# Patient Record
Sex: FEMALE | Race: WHITE | NOT HISPANIC OR LATINO | Employment: PART TIME | URBAN - METROPOLITAN AREA
[De-identification: names, ages, dates, MRNs, and addresses within clinical notes are randomized per-mention and may not be internally consistent; named-entity substitution may affect disease eponyms.]

---

## 2022-10-06 ENCOUNTER — OFFICE VISIT (OUTPATIENT)
Dept: DERMATOLOGY | Facility: CLINIC | Age: 32
End: 2022-10-06
Payer: COMMERCIAL

## 2022-10-06 DIAGNOSIS — Z12.83 SKIN CANCER SCREENING: ICD-10-CM

## 2022-10-06 DIAGNOSIS — L72.11 PILAR CYST: ICD-10-CM

## 2022-10-06 DIAGNOSIS — D22.9 MULTIPLE MELANOCYTIC NEVI: Primary | ICD-10-CM

## 2022-10-06 PROCEDURE — 99203 OFFICE O/P NEW LOW 30 MIN: CPT | Performed by: DERMATOLOGY

## 2022-10-06 ASSESSMENT — PAIN SCALES - GENERAL: PAINLEVEL: NO PAIN (0)

## 2022-10-06 NOTE — LETTER
10/6/2022       RE: Ciera Winters  [address]  [facility] [address]     Dear Colleague,    Thank you for referring your patient, Ciera Winters, to the [facility] DERMATOLOGY [facility] [place] [facility]. Please see a copy of my visit note below.    [facility] Dermatology Note  Encounter Date: Oct 6, 2022  Office Visit     Dermatology Problem List:  1. Skin check  - Benign nevi noted on exam  - Pilar cyst on right parietal scalp. Reassurance provided, can refer to derm surg if patient prefers in future     ____________________________________________    Assessment & Plan:     # Benign appearing nevi scattered on body   - <50 nevi overall  - Will monitor any change in the nevus noted on right third toe. Photo taken today.     # Pilar cyst on right parietal scalp  - Asymptomatic   - Reassurance provided. Can refer to dermatologic surgery if elective surgery is preferred in the future     Procedures Performed:   None    Follow-up: 1 year(s) in-person, or earlier for new or changing lesions    Staff and Medical Student:     Daysi Bateman, MS4    I was present with the medical student who participated in the service and in the documentation.  I have verified the history and personally performed the physical exam and medical decision making.  I agree with the assessment and plan of care as documented in the note.      Miah Deleon MD  Dermatology Attending  ____________________________________________    CC: Skin Check (Ciera is here for a skin check and has a spot of concern on her scalp.)    HPI:  Ms. Ciera Winters is a(n) 32 year old female who presents today as a new patient for skin check. She has no personal or family history of skin cancer. She has a few spots of concern. She noticed a cyst on her right parietal scalp that has been slightly growing but is overall asymptomatic. She also noticed a spot on her left upper  back next to her tattoo but it was there before that. It's been stable in size and color for several years but wants to make sure it is benign. She also noticed a brown spot on her right third toe that has been stable in size and color for years.     Patient is otherwise feeling well, without additional skin concerns.    Labs Reviewed:  None    Physical Exam:  Vitals: There were no vitals taken for this visit.  SKIN: Total skin excluding the undergarment areas was performed. The exam included the head/face, neck, both arms, chest, back, abdomen, both legs, digits and/or nails.   - 1 cm x .8 cm smooth, mobile, firm, round nodule on the right parietal scalp   - 4 x 4 mm skin colored papule with a patch of brown pigmentation and follicular prominence on the left upper back next to tattoo  - 5 x 5 mm symmetric brown papule with darker pigmentation in the furrows  - No other lesions of concern on areas examined.     Medications:  No current outpatient medications on file.     No current facility-administered medications for this visit.      Past Medical History:   There is no problem list on file for this patient.    No past medical history on file.          Again, thank you for allowing me to participate in the care of your patient.      Sincerely,    Miah Deleon MD

## 2022-10-06 NOTE — LETTER
Date:November 4, 2022      Patient was self referred, no letter generated. Do not send.        [facility] Health Information

## 2022-10-06 NOTE — NURSING NOTE
Dermatology Rooming Note    Ciera Winters's goals for this visit include:   Chief Complaint   Patient presents with     Skin Check     Ciera is here for a skin check and has a spot of concern on her scalp.     Basil Pena, EMT

## 2022-10-06 NOTE — PATIENT INSTRUCTIONS
The spots that you pointed out to us are all benign in nature. But, please keep an eye out for any changing spots that are growing, changing color, bleeding, itching, or causing pain.     For the cyst on your scalp, you can get it removed but it is elective and we can refer you to surgery.     We took a picture of the mole on your right toe and will monitor it for growth.     Please follow up in 1 year to check on the spot on your toe.

## 2022-10-06 NOTE — PROGRESS NOTES
[facility] Dermatology Note  Encounter Date: Oct 6, 2022  Office Visit     Dermatology Problem List:  1. Skin check  - Benign nevi noted on exam  - Pilar cyst on right parietal scalp. Reassurance provided, can refer to derm surg if patient prefers in future     ____________________________________________    Assessment & Plan:     # Benign appearing nevi scattered on body   - <50 nevi overall  - Will monitor any change in the nevus noted on right third toe. Photo taken today.     # Pilar cyst on right parietal scalp  - Asymptomatic   - Reassurance provided. Can refer to dermatologic surgery if elective surgery is preferred in the future     Procedures Performed:   None    Follow-up: 1 year(s) in-person, or earlier for new or changing lesions    Staff and Medical Student:     Daysi Bateman, MS4    I was present with the medical student who participated in the service and in the documentation.  I have verified the history and personally performed the physical exam and medical decision making.  I agree with the assessment and plan of care as documented in the note.      Miah Deleon MD  Dermatology Attending  ____________________________________________    CC: Skin Check (Ciera is here for a skin check and has a spot of concern on her scalp.)    HPI:  Ms. Ciera Winters is a(n) 32 year old female who presents today as a new patient for skin check. She has no personal or family history of skin cancer. She has a few spots of concern. She noticed a cyst on her right parietal scalp that has been slightly growing but is overall asymptomatic. She also noticed a spot on her left upper back next to her tattoo but it was there before that. It's been stable in size and color for several years but wants to make sure it is benign. She also noticed a brown spot on her right third toe that has been stable in size and color for years.     Patient is otherwise feeling well, without additional skin  concerns.    Labs Reviewed:  None    Physical Exam:  Vitals: There were no vitals taken for this visit.  SKIN: Total skin excluding the undergarment areas was performed. The exam included the head/face, neck, both arms, chest, back, abdomen, both legs, digits and/or nails.   - 1 cm x .8 cm smooth, mobile, firm, round nodule on the right parietal scalp   - 4 x 4 mm skin colored papule with a patch of brown pigmentation and follicular prominence on the left upper back next to tattoo  - 5 x 5 mm symmetric brown papule with darker pigmentation in the furrows  - No other lesions of concern on areas examined.     Medications:  No current outpatient medications on file.     No current facility-administered medications for this visit.      Past Medical History:   There is no problem list on file for this patient.    No past medical history on file.

## 2022-11-03 PROBLEM — Z12.83 SKIN CANCER SCREENING: Status: ACTIVE | Noted: 2022-11-03

## 2022-11-03 PROBLEM — L72.11 PILAR CYST: Status: ACTIVE | Noted: 2022-11-03

## 2022-11-03 PROBLEM — D22.9 MULTIPLE MELANOCYTIC NEVI: Status: ACTIVE | Noted: 2022-11-03

## 2023-03-25 ENCOUNTER — PATIENT OUTREACH (OUTPATIENT)
Dept: DERMATOLOGY | Facility: CLINIC | Age: 33
End: 2023-03-25
Payer: COMMERCIAL

## 2023-03-25 NOTE — TELEPHONE ENCOUNTER
Attempted to reach patient to schedule follow up in the Dermatology Clinic.  No answer,  LM on VM to call office and Letter mailed..    Schedule with Dr. Miah Deleon 10/2023.

## 2023-03-25 NOTE — LETTER
March 25, 2023      Ciera Winters  [address]  [place] [address]        Dear Ciera Winters:    We recently reviewed your medical records from [facility] and found that you are due for an appointment with Dr. Miah Deleon in October.  Our office has attempted to reach you via telephone and left a message.    At your earliest convenience, please call the clinic at [phone] to arrange the recommended exam and possible testing.  Please kindly mention this letter when calling so that your appointment(s) can be accurately scheduled.      Sincerely,       The Clinic Staff        Medical Record Number:  [id number]

## 2023-04-01 ENCOUNTER — PATIENT OUTREACH (OUTPATIENT)
Dept: DERMATOLOGY | Facility: CLINIC | Age: 33
End: 2023-04-01
Payer: COMMERCIAL

## 2023-04-01 NOTE — LETTER
April 1, 2023          Ciera Winters  [address]  [facility] [place] [facility] [address]        Dear Ciera Winters:    We recently reviewed your medical records from [facility] and found that you are due for an appointment with Dr. Miah Deleon in October.  Our office has attempted to reach you via telephone and left a message***.    At your earliest convenience, please call the clinic at [phone] to arrange the recommended exam and possible testing.  Please kindly mention this letter when calling so that your appointment(s) can be accurately scheduled.      Sincerely,       The Clinic Staff        Medical Record Number:  [id number]

## 2024-05-17 SDOH — HEALTH STABILITY: PHYSICAL HEALTH: ON AVERAGE, HOW MANY DAYS PER WEEK DO YOU ENGAGE IN MODERATE TO STRENUOUS EXERCISE (LIKE A BRISK WALK)?: 5 DAYS

## 2024-05-17 SDOH — HEALTH STABILITY: PHYSICAL HEALTH: ON AVERAGE, HOW MANY MINUTES DO YOU ENGAGE IN EXERCISE AT THIS LEVEL?: 30 MIN

## 2024-05-17 ASSESSMENT — SOCIAL DETERMINANTS OF HEALTH (SDOH): HOW OFTEN DO YOU GET TOGETHER WITH FRIENDS OR RELATIVES?: THREE TIMES A WEEK

## 2024-06-02 SDOH — HEALTH STABILITY: PHYSICAL HEALTH: ON AVERAGE, HOW MANY DAYS PER WEEK DO YOU ENGAGE IN MODERATE TO STRENUOUS EXERCISE (LIKE A BRISK WALK)?: 5 DAYS

## 2024-06-02 SDOH — HEALTH STABILITY: PHYSICAL HEALTH: ON AVERAGE, HOW MANY MINUTES DO YOU ENGAGE IN EXERCISE AT THIS LEVEL?: 30 MIN

## 2024-06-02 ASSESSMENT — SOCIAL DETERMINANTS OF HEALTH (SDOH): HOW OFTEN DO YOU GET TOGETHER WITH FRIENDS OR RELATIVES?: THREE TIMES A WEEK

## 2024-06-03 ENCOUNTER — OFFICE VISIT (OUTPATIENT)
Dept: FAMILY MEDICINE | Facility: CLINIC | Age: 34
End: 2024-06-03
Payer: COMMERCIAL

## 2024-06-03 VITALS
TEMPERATURE: 98 F | HEART RATE: 60 BPM | RESPIRATION RATE: 16 BRPM | SYSTOLIC BLOOD PRESSURE: 107 MMHG | OXYGEN SATURATION: 98 % | HEIGHT: 63 IN | DIASTOLIC BLOOD PRESSURE: 68 MMHG | BODY MASS INDEX: 22.86 KG/M2 | WEIGHT: 129 LBS

## 2024-06-03 DIAGNOSIS — B96.89 BV (BACTERIAL VAGINOSIS): ICD-10-CM

## 2024-06-03 DIAGNOSIS — N89.8 VAGINAL DISCHARGE: ICD-10-CM

## 2024-06-03 DIAGNOSIS — Z12.4 CERVICAL CANCER SCREENING: ICD-10-CM

## 2024-06-03 DIAGNOSIS — Z97.5 IUD (INTRAUTERINE DEVICE) IN PLACE: ICD-10-CM

## 2024-06-03 DIAGNOSIS — N76.0 BV (BACTERIAL VAGINOSIS): ICD-10-CM

## 2024-06-03 DIAGNOSIS — Z00.00 VISIT FOR PREVENTIVE HEALTH EXAMINATION: Primary | ICD-10-CM

## 2024-06-03 LAB
ANION GAP SERPL CALCULATED.3IONS-SCNC: 9 MMOL/L (ref 7–15)
BUN SERPL-MCNC: 9.6 MG/DL (ref 6–20)
CALCIUM SERPL-MCNC: 9.2 MG/DL (ref 8.6–10)
CHLORIDE SERPL-SCNC: 103 MMOL/L (ref 98–107)
CHOLEST SERPL-MCNC: 200 MG/DL
CLUE CELLS: PRESENT
CREAT SERPL-MCNC: 0.69 MG/DL (ref 0.51–0.95)
DEPRECATED HCO3 PLAS-SCNC: 25 MMOL/L (ref 22–29)
EGFRCR SERPLBLD CKD-EPI 2021: >90 ML/MIN/1.73M2
FASTING STATUS PATIENT QL REPORTED: YES
FASTING STATUS PATIENT QL REPORTED: YES
GLUCOSE SERPL-MCNC: 87 MG/DL (ref 70–99)
HDLC SERPL-MCNC: 88 MG/DL
LDLC SERPL CALC-MCNC: 103 MG/DL
NONHDLC SERPL-MCNC: 112 MG/DL
POTASSIUM SERPL-SCNC: 3.8 MMOL/L (ref 3.4–5.3)
SODIUM SERPL-SCNC: 137 MMOL/L (ref 135–145)
TRICHOMONAS, WET PREP: ABNORMAL
TRIGL SERPL-MCNC: 45 MG/DL
WBC'S/HIGH POWER FIELD, WET PREP: ABNORMAL
YEAST, WET PREP: ABNORMAL

## 2024-06-03 PROCEDURE — 36415 COLL VENOUS BLD VENIPUNCTURE: CPT

## 2024-06-03 PROCEDURE — G0145 SCR C/V CYTO,THINLAYER,RESCR: HCPCS

## 2024-06-03 PROCEDURE — 80048 BASIC METABOLIC PNL TOTAL CA: CPT

## 2024-06-03 PROCEDURE — 87624 HPV HI-RISK TYP POOLED RSLT: CPT

## 2024-06-03 PROCEDURE — 80061 LIPID PANEL: CPT

## 2024-06-03 PROCEDURE — 99385 PREV VISIT NEW AGE 18-39: CPT

## 2024-06-03 PROCEDURE — 87210 SMEAR WET MOUNT SALINE/INK: CPT

## 2024-06-03 RX ORDER — METRONIDAZOLE 500 MG/1
500 TABLET ORAL 2 TIMES DAILY
Qty: 14 TABLET | Refills: 0 | Status: SHIPPED | OUTPATIENT
Start: 2024-06-03 | End: 2024-06-10

## 2024-06-03 RX ORDER — COPPER 313.4 MG/1
1 INTRAUTERINE DEVICE INTRAUTERINE ONCE
COMMUNITY
Start: 2017-11-02 | End: 2027-10-31

## 2024-06-03 NOTE — PROGRESS NOTES
Preventive Care Visit  [facility] C[facility], Nurse Practitioner Primary Care  Gordo 3, 2024      Assessment & Plan     Visit for preventive health examination  Fasting, screening labs ordered as indicated below.   - Lipid panel reflex to direct LDL Fasting; Future  - Basic metabolic panel  (Ca, Cl, CO2, Creat, Gluc, K, Na, BUN); Future  - Lipid panel reflex to direct LDL Fasting  - Basic metabolic panel  (Ca, Cl, CO2, Creat, Gluc, K, Na, BUN)    Cervical cancer screening  Collected  - Pap Screen with HPV - Recommended Age 30 - 65 Years    IUD (intrauterine device) in place  Copper IUD in place placed November 2017    Vaginal discharge  Wet prep collected and positive for clue cells. Metronidazole prescribed.   - Wet prep - Clinic Collect    BV (bacterial vaginosis)  - metroNIDAZOLE (FLAGYL) 500 MG tablet; Take 1 tablet (500 mg) by mouth 2 times daily for 7 days    Counseling  Appropriate preventive services were discussed with this patient, including applicable screening as appropriate for fall prevention, nutrition, physical activity, Tobacco-use cessation, weight loss and cognition.  Checklist reviewing preventive services available has been given to the patient.  Reviewed patient's diet, addressing concerns and/or questions.   She is at risk for psychosocial distress and has been provided with information to reduce risk.       Benita Vang is a 34 year old, presenting for the following:  Physical        6/3/2024     8:48 AM   Additional Questions   Roomed by buffy baker        Health Care Directive  Patient does not have a Health Care Directive or Living Will: Discussed advance care planning with patient; information given to patient to review.    HPI  Copper IUD placed in November 2017  Fasting        6/2/2024   General Health   How would you rate your overall physical health? Excellent   Feel stress (tense, anxious, or unable to sleep) Only a little   (!) STRESS  CONCERN      6/2/2024   Nutrition   Three or more servings of calcium each day? Yes   Diet: Vegetarian/vegan   How many servings of fruit and vegetables per day? (!) 2-3   How many sweetened beverages each day? 0-1         6/2/2024   Exercise   Days per week of moderate/strenous exercise 5 days   Average minutes spent exercising at this level 30 min   Walks with dog      6/2/2024   Social Factors   Frequency of gathering with friends or relatives Three times a week   Worry food won't last until get money to buy more No   Food not last or not have enough money for food? No   Do you have housing?  Yes   Are you worried about losing your housing? No   Lack of transportation? No   Unable to get utilities (heat,electricity)? No         6/2/2024   Dental   Dentist two times every year? Yes         5/17/2024   TB Screening   Were you born outside of the [place]? No     Today's PHQ-2 Score:       6/2/2024     2:43 PM   PHQ-2 ( 1999 [employer])   Q1: Little interest or pleasure in doing things 0   Q2: Feeling down, depressed or hopeless 0   PHQ-2 Score 0   Q1: Little interest or pleasure in doing things Not at all   Q2: Feeling down, depressed or hopeless Not at all   PHQ-2 Score 0   Goes to therapy, no current concerns        6/2/2024   Substance Use   Alcohol more than 3/day or more than 7/wk No   Do you use any other substances recreationally? (!) CANNABIS PRODUCTS     Social History     Tobacco Use    Smoking status: Never    Smokeless tobacco: Never   Vaping Use    Vaping status: Never Used   Substance Use Topics    Alcohol use: Yes    Drug use: Never      Discussed self breast exams  Mammogram Screening - Patient under 40 years of age: Routine Mammogram Screening not recommended.           6/2/2024   One time HIV Screening   Previous HIV test? No         6/2/2024   STI Screening   New sexual partner(s) since last STI/HIV test? No     History of abnormal Pap smear: No - age 30-64 HPV with reflex Pap every 5 years  "recommended  Patient reports no previous abnormal PAPs             6/2/2024   Contraception/Family Planning   Questions about contraception or family planning No        Reviewed and updated as needed this visit by Provider   Tobacco  Allergies  Meds  Problems  Med Hx  Surg Hx  Fam Hx                     Objective    Exam  /68 (BP Location: Right arm, Patient Position: Sitting, Cuff Size: Adult Regular)   Pulse 60   Temp 98  F (36.7  C) (Oral)   Resp 16   Ht 1.607 m (5' 3.25\")   Wt 58.5 kg (129 lb)   LMP 05/22/2024 (Within Days)   SpO2 98%   BMI 22.67 kg/m     Estimated body mass index is 22.67 kg/m  as calculated from the following:    Height as of this encounter: 1.607 m (5' 3.25\").    Weight as of this encounter: 58.5 kg (129 lb).    Physical Exam  GENERAL: alert and no distress  EYES: Eyes grossly normal to inspection, PERRL and conjunctivae and sclerae normal  HENT: ear canals and TM's normal, nose and mouth without ulcers or lesions  NECK: no adenopathy, no asymmetry, masses, or scars  RESP: lungs clear to auscultation - no rales, rhonchi or wheezes  CV: regular rate and rhythm, normal S1 S2, no S3 or S4, no murmur, click or rub, no peripheral edema  ABDOMEN: soft, nontender, no hepatosplenomegaly, no masses and bowel sounds normal   (female): normal female external genitalia, normal urethral meatus, normal vaginal mucosa  MS: no gross musculoskeletal defects noted, no edema  NEURO: Normal strength and tone, mentation intact and speech normal  PSYCH: mentation appears normal, affect normal/bright    Signed Electronically by: EMILY Trevizo CNP    "

## 2024-06-04 LAB
HPV HR 12 DNA CVX QL NAA+PROBE: NEGATIVE
HPV16 DNA CVX QL NAA+PROBE: NEGATIVE
HPV18 DNA CVX QL NAA+PROBE: NEGATIVE
HUMAN PAPILLOMA VIRUS FINAL DIAGNOSIS: NORMAL

## 2024-06-04 NOTE — RESULT ENCOUNTER NOTE
Dmitriy Vang,     It was nice meeting you the other day.      Your labs are normal. The lab said that the mycoplasma genitalium needs to be a urine sample and they were unable to run the test I collected. Since we know that the vaginal discharge is due to BV, I do not think we necessarily need to complete this test but if you would like to, please let me know and I will place a new order. Im sorry about the mix up.      Feel free to contact me via [employer] or call the clinic at [phone].     Sincerely,  Domenica Nunez, LIBAN, FNP-C

## 2024-06-06 LAB
BKR LAB AP GYN ADEQUACY: NORMAL
BKR LAB AP GYN INTERPRETATION: NORMAL
BKR LAB AP PREVIOUS ABNORMAL: NORMAL
PATH REPORT.COMMENTS IMP SPEC: NORMAL
PATH REPORT.COMMENTS IMP SPEC: NORMAL
PATH REPORT.RELEVANT HX SPEC: NORMAL

## 2024-11-03 ENCOUNTER — VIRTUAL VISIT (OUTPATIENT)
Dept: URGENT CARE | Facility: CLINIC | Age: 34
End: 2024-11-03
Payer: COMMERCIAL

## 2024-11-03 DIAGNOSIS — B37.31 YEAST INFECTION OF THE VAGINA: Primary | ICD-10-CM

## 2024-11-03 PROCEDURE — 99203 OFFICE O/P NEW LOW 30 MIN: CPT | Mod: 95

## 2024-11-03 RX ORDER — FLUCONAZOLE 150 MG/1
150 TABLET ORAL ONCE
Qty: 1 TABLET | Refills: 0 | Status: SHIPPED | OUTPATIENT
Start: 2024-11-03 | End: 2024-11-03

## 2024-11-03 NOTE — PROGRESS NOTES
Ciera is a 34 year old female who presents for a billable video visit.    ASSESSMENT/PLAN:  Diagnoses and all orders for this visit:    Yeast infection of the vagina  -     fluconazole (DIFLUCAN) 150 MG tablet; Take 1 tablet (150 mg) by mouth once for 1 dose.    Pt presents with yeast infection symptoms. Has had yeast infections in the past. Will treat with one dose of diflucan. If no improvement or worsening symptoms instructed to be seen in person. Pt agrees with plan.     SUBJECTIVE: Pt reports vaginal itching and discharge. Reports feels like her previous yeast infections. No smell.       ROS: Pertinent ROS neg other than the symptoms noted above in the HPI.     OBJECTIVE:  Vitals not done due to this being a virtual visit    GENERAL: healthy, alert and no distress  EYES: Eyes grossly normal to inspection,conjunctivae and sclerae normal  RESP: Able to speak in complete sentences, no audible wheeze or cough  SKIN: no suspicious lesions or rashes  NEURO: mentation intact and speech normal  PSYCH: mentation appears normal, affect normal/bright    Video-Visit Details    Type of service:  Video Visit  Video Start Time: 1330  Video End Time: 1330    Originating Location: Home    Distant Location:  [facility] VIRTUAL URGENT CARE     Platform used for Video Visit: EMILY Walker CNP

## 2025-07-20 ENCOUNTER — HEALTH MAINTENANCE LETTER (OUTPATIENT)
Age: 35
End: 2025-07-20